# Patient Record
Sex: FEMALE | Race: BLACK OR AFRICAN AMERICAN | URBAN - METROPOLITAN AREA
[De-identification: names, ages, dates, MRNs, and addresses within clinical notes are randomized per-mention and may not be internally consistent; named-entity substitution may affect disease eponyms.]

---

## 2023-10-30 DIAGNOSIS — J33.9 NOSE POLYP: Primary | ICD-10-CM

## 2023-10-30 RX ORDER — FLUTICASONE PROPIONATE 93 UG/1
1 SPRAY, METERED NASAL 2 TIMES DAILY
COMMUNITY
Start: 2019-04-29 | End: 2023-10-30 | Stop reason: SDUPTHER

## 2023-10-31 RX ORDER — FLUTICASONE PROPIONATE 93 UG/1
1 SPRAY, METERED NASAL 2 TIMES DAILY
Qty: 16 ML | Refills: 11 | Status: SHIPPED | OUTPATIENT
Start: 2023-10-31

## 2023-11-27 ENCOUNTER — OFFICE VISIT (OUTPATIENT)
Dept: OTOLARYNGOLOGY | Facility: CLINIC | Age: 53
End: 2023-11-27
Payer: COMMERCIAL

## 2023-11-27 VITALS — HEIGHT: 63 IN | BODY MASS INDEX: 29.16 KG/M2 | WEIGHT: 164.6 LBS | TEMPERATURE: 96.6 F

## 2023-11-27 DIAGNOSIS — J32.2 CHRONIC ETHMOIDAL SINUSITIS: ICD-10-CM

## 2023-11-27 DIAGNOSIS — R09.81 NASAL CONGESTION: ICD-10-CM

## 2023-11-27 DIAGNOSIS — J33.9 NASAL POLYP: ICD-10-CM

## 2023-11-27 DIAGNOSIS — R43.8 DECREASED SENSE OF SMELL: Primary | ICD-10-CM

## 2023-11-27 PROCEDURE — 31231 NASAL ENDOSCOPY DX: CPT | Performed by: OTOLARYNGOLOGY

## 2023-11-27 PROCEDURE — 99213 OFFICE O/P EST LOW 20 MIN: CPT | Performed by: OTOLARYNGOLOGY

## 2023-11-27 RX ORDER — MONTELUKAST SODIUM 10 MG/1
1 TABLET ORAL NIGHTLY
COMMUNITY
Start: 2015-03-17

## 2023-11-27 RX ORDER — OMEPRAZOLE 40 MG/1
1 CAPSULE, DELAYED RELEASE ORAL DAILY
COMMUNITY
Start: 2021-11-16

## 2023-11-27 RX ORDER — ASPIRIN 325 MG
325 TABLET ORAL
COMMUNITY

## 2023-11-27 RX ORDER — FLUTICASONE FUROATE AND VILANTEROL TRIFENATATE 200; 25 UG/1; UG/1
POWDER RESPIRATORY (INHALATION)
COMMUNITY

## 2023-11-27 RX ORDER — ALBUTEROL SULFATE 90 UG/1
AEROSOL, METERED RESPIRATORY (INHALATION)
COMMUNITY
Start: 2022-05-23

## 2023-11-27 RX ORDER — HYDROCHLOROTHIAZIDE 25 MG/1
25 TABLET ORAL
COMMUNITY
Start: 2023-05-31

## 2023-11-27 RX ORDER — BUDESONIDE 0.5 MG/2ML
INHALANT ORAL
COMMUNITY

## 2023-11-27 RX ORDER — SALINE NASAL SPRAY 1.5 OZ
SOLUTION NASAL
COMMUNITY
Start: 2018-08-17

## 2023-11-27 RX ORDER — LORATADINE 10 MG/1
CAPSULE, LIQUID FILLED ORAL
COMMUNITY
Start: 2018-12-21

## 2023-11-27 RX ORDER — ALBUTEROL SULFATE 0.83 MG/ML
2.5 SOLUTION RESPIRATORY (INHALATION) EVERY 6 HOURS PRN
COMMUNITY
Start: 2020-06-16

## 2023-11-27 ASSESSMENT — PATIENT HEALTH QUESTIONNAIRE - PHQ9
2. FEELING DOWN, DEPRESSED OR HOPELESS: SEVERAL DAYS
SUM OF ALL RESPONSES TO PHQ9 QUESTIONS 1 & 2: 2
1. LITTLE INTEREST OR PLEASURE IN DOING THINGS: SEVERAL DAYS

## 2023-11-27 NOTE — PROGRESS NOTES
Chief Complaint:  1. Chronic sinusitis with nasal polyposis s/p bilateral sinus surgery 8/17/18; revision through [facility] 2020  2. Nasal airway obstruction s/p nasal airway surgery 8/17/18   3. Rhinorrhea  4. Headaches  5. Decreased sense of smell (COVID positive Oct 2020 and Dec 2021)  6. Throat clearing, coughing  7. Chronic rhinitis, epistaxis  8. Asthma / aspirin sensitivity (s/p ASA desensitization)  9. Allergic rhinitis    History Of Present Illness:    Main Symptoms:  Patient does not have anterior nasal drainage.     Patient has  posterior nasal drainage.    Patient has left-sided nasal airway obstruction and took Sudafed today for this.  Patient does not have  facial pain.    Patient does not have  facial pressure.    Patient has decreased sense of smell. Decreased 5 % of normal.   Associated Symptoms:   Patient has  headaches, and she has a headache today on the left side.   Patient has throat clearing that is constant in the morning.   Patient has coughing.    Patient does not have dysphonia.   Patient has minor nasal bleeding.     Medications currently on for sinonasal symptoms: She tries to utilize Xhance 2 sprays each nostril once per day and budesonide rinses once per day and does this about 5 days/week.  On the days that she does not use both she uses 1 or a other once per day.  Aspirin 325 mg daily, Sudafed as needed    Kate Navarro presents being last seen 6/6/23.     She continues with symptoms as outlined above.  She has not yet been assessed for allergy to determine if she is a candidate for a biologic.     Active Problems:    There is no problem list on file for this patient.       Past Medical History:  She has a past medical history of Allergy status to unspecified drugs, medicaments and biological substances, Chronic maxillary sinusitis (11/13/2018), Encounter for fertility testing (06/26/2015), Encounter for screening for infections with a predominantly sexual mode of transmission (03/15/2018),  "Hypertrophy of nasal turbinates (05/21/2018), Other acute sinusitis (02/11/2017), Other conditions influencing health status (06/26/2015), Other conditions influencing health status (02/11/2017), Other ovarian dysfunction (09/04/2015), Personal history of other benign neoplasm (06/26/2015), Personal history of other diseases of the respiratory system, Personal history of other diseases of the respiratory system (08/28/2018), Personal history of other diseases of the respiratory system (07/10/2018), Personal history of other diseases of the respiratory system (07/10/2018), Personal history of other specified conditions (07/10/2018), Unspecified asthma, uncomplicated (07/10/2018), and Unspecified hearing loss, left ear (11/13/2018).    Surgical History:  She has no past surgical history on file.     Family History:  No family history on file.    Social History:  She reports that she has been smoking cigarettes. She does not have any smokeless tobacco history on file. No history on file for alcohol use and drug use.     Allergies:  Patient has no allergy information on record.    Current Meds:    Current Outpatient Medications:     Xhance 93 mcg/actuation aerosol breath activated, Administer 1 spray into affected nostril(s) 2 times a day., Disp: 16 mL, Rfl: 11    Vitals:  Visit Vitals  Temp 35.9 °C (96.6 °F)   Ht 1.6 m (5' 3\")   Wt 74.7 kg (164 lb 9.6 oz)   BMI 29.16 kg/m²   Smoking Status Every Day   BSA 1.82 m²        Physical Exam:  Nose: On external exam there are neither lesions nor asymmetry of the nasal tip/dorsum. On anterior rhinoscopy, visualization posteriorly is limited on anterior examination. For this reason, to adequately evaluate posteriorly for masses, source of epistaxis, polypoid disease, debridement, and/or signs of infections, nasal endoscopy is indicated.  (Please see procedure below.)    SINONASAL ENDOSCOPY (CPT 23044): To better evaluate the patient's symptoms, sinonasal endoscopy is indicated.  " After discussion of risks and benefits, and topical decongestion and anesthesia,an endoscope was used to perform nasal endoscopy on each side.  A time out identifying the patient, the procedure, the location of the procedure and any concerns was performed prior to beginning the procedure.    Findings:  Examination of the right nasal cavity revealed a patent maxillary sinus, ethmoid cavity, and sphenoid.  There was polypoid tissue along the ethmoid skull base but the maxillary and sphenoid mucosa looked normal.  Examination of the left nasal cavity revealed more significant polypoid tissue.  The base of the maxillary and the sphenoid were patent but there as polypoid tissue throughout the ethmoid cavity extending to the superior aspect of the inferior turbinate.  This was associated with mucus.    Provider Impressions:  1.  Chronic sinusitis with nasal polyposis s/p bilateral sinus surgery 8/17/18; revision through [facility] 2020  2.  Nasal airway obstruction s/p nasal airway surgery 8/17/18   3.  Postnasal drainage  4.  Decreased sense of smell  5.  Headaches  6.  Throat clearing, coughing  7.  Intermittent epistaxis  8.  Asthma / aspirin sensitivity (s/p ASA desensitization)  9.  Allergic rhinitis    Discussion:  Kate Navarro and I discussed a number of options.  She has had 2 previous sinus surgeries and I would not recommend revision surgery at this time.  Her current intranasal medical therapy is very good but she could consider mometasone 2 mg instead of budesonide we discussed some considerations with this therapy particularly financial.    We also again reviewed consideration of a biologic through one of my allergy partners.  She did not wish to give herself injections but there are Biologics that are administered through a physician's office and this may be a better option for her than something that she needs to utilize at home and administer to herself.  I provided her with a consultation to be evaluated by   Nani to discuss further options.  I also stressed to her limiting the amount of prednisone taper she gets as there are long-term health consequences to frequent administration.    I would like to see her back about 6 months after she initiates biologic therapy for reassessment.  If she wishes to defer biologic therapy in favor of alternative intranasal medical therapy I think she would do well with a trial of mometasone 2 mg within rinses.  All questions were answered.    Patient Discussion/Summary:  Please followup with me in 6 months for reevaluation or sooner with any questions or concerns. Please feel free to contact my office by calling [phone] with any questions.    Signature:  Scribe Attestation  By signing my name below, IGladis Scribe   attest that this documentation has been prepared under the direction and in the presence of Francois Ceballos MD

## 2023-12-20 ENCOUNTER — OFFICE VISIT (OUTPATIENT)
Dept: ALLERGY | Facility: HOSPITAL | Age: 53
End: 2023-12-20
Payer: COMMERCIAL

## 2023-12-20 VITALS
BODY MASS INDEX: 28.7 KG/M2 | RESPIRATION RATE: 16 BRPM | TEMPERATURE: 98 F | HEART RATE: 68 BPM | OXYGEN SATURATION: 100 % | WEIGHT: 162 LBS | SYSTOLIC BLOOD PRESSURE: 121 MMHG | DIASTOLIC BLOOD PRESSURE: 77 MMHG | HEIGHT: 63 IN

## 2023-12-20 DIAGNOSIS — J31.0 CHRONIC RHINITIS: ICD-10-CM

## 2023-12-20 DIAGNOSIS — J33.9 NASAL POLYP: ICD-10-CM

## 2023-12-20 DIAGNOSIS — J45.40 MODERATE PERSISTENT ASTHMA WITHOUT COMPLICATION (HHS-HCC): Primary | ICD-10-CM

## 2023-12-20 PROCEDURE — 99205 OFFICE O/P NEW HI 60 MIN: CPT | Performed by: ALLERGY & IMMUNOLOGY

## 2023-12-20 PROCEDURE — 99215 OFFICE O/P EST HI 40 MIN: CPT | Performed by: ALLERGY & IMMUNOLOGY

## 2023-12-20 ASSESSMENT — ENCOUNTER SYMPTOMS
MUSCULOSKELETAL NEGATIVE: 1
RESPIRATORY NEGATIVE: 1
CONSTITUTIONAL NEGATIVE: 1
HEMATOLOGIC/LYMPHATIC NEGATIVE: 1
GASTROINTESTINAL NEGATIVE: 1
CARDIOVASCULAR NEGATIVE: 1
ALLERGIC/IMMUNOLOGIC NEGATIVE: 1
EYES NEGATIVE: 1

## 2023-12-20 NOTE — PROGRESS NOTES
Kate Navarro presents for initial evaluation today.      Kate Navarro was seen at the request of Jean Carlos Collazo MD for a chief complaint of nasal polyps, asthma, allergies; a report with my findings is being sent via written or electronic means to Jean Carlos Collazo MD with my recommendations for treatment    She provides the following history:    She states that she gets a sinus infection every 90 days.  She also has a history of nasal polyposis and has had polypectomy x2.  She notes that approximately every 90 days she gets postnasal drainage, green mucus, loss of sense of smell and taste.  She generally takes antibiotics and prednisone every time.  She notes that the last time she felt like she had a sinus infection, she had rhinoscopy performed in ENT and there was no infection noted.     She is on nasal rinses with budesonide daily, Xhance 2 sprays once daily.  She also takes Claritin, montelukast, Breo, and aspirin 325 mg once daily after having an aspirin desensitization for AERD in October 2018 at [facility], with Dr. Mchugh.  She had also had aspirin desensitization years prior to her 10/2018 aspirin desensitization and had stopped taking aspirin due to GI upset and between the 2 aspirin desensitizations.    She notes that prior to aspirin desensitization, she would have an asthma exacerbation nasal congestion with ingestion of aspirin or NSAIDs.      She has also previously followed with Dr. Abbott for allergies and had allergy in July 2018 she notes that her allergy symptoms include itchy eyes, runny nose nasal congestion, occurring year-round.  She has never been on allergy shots.    Asthma: She reports that she was diagnosed with asthma in her late 30s.  She is on Breo and Singulair.  She last required rescue albuterol approximately 1 year ago.      Eczema: States that she has a history of eczema.  Flares in summer on neck, worsened with necklaces     Food allergy: Denies     Venom allergy:  Denies     Drug allergy:  "ASA/NSAIDS      Environmental History:  Type of home:  House  Pets in the house: None  Mold or moisture in the home: None  Bedroom osmar: Carpet  Dust mite covers on bed:  Yes  Cigarette exposure in the home:  Yes 2-3 cigs daily  Occupation/School: works from home for an [employer], works from home, then works at [employer] in evenings     Pertinent Allergy/Immunology family history:  Environmental allergies and shellfish allergy in brother and sister   Sister has chronic bronchitis     Review of Systems   Constitutional: Negative.    HENT: Negative.     Eyes: Negative.    Respiratory: Negative.     Cardiovascular: Negative.    Gastrointestinal: Negative.    Musculoskeletal: Negative.    Skin: Negative.    Allergic/Immunologic: Negative.    Hematological: Negative.         Vital signs:  /77 (BP Location: Right arm)   Pulse 68   Temp 36.7 °C (98 °F) (Oral)   Resp 16   Ht 1.6 m (5' 3\")   Wt 73.5 kg (162 lb)   SpO2 100%   BMI 28.70 kg/m²     Physical Exam:  GENERAL: Alert, oriented and in no acute distress.     HEENT: EYES: No conjunctival injection or cobblestoning. Nose: nasal turbinates mildly edematous and are not boggy.  There is no mucous stranding, polyps, or blood    noted. EARS: Tympanic membranes are clear. MOUTH: moist and pink with no exudates, ulcers, or thrush. NECK: is supple, without adenopathy.  No upper airway stridor noted.       HEART: regular rate and rhythm.       LUNGS: Clear to auscultation bilaterally. No wheezing, rhonchi or rales.        ABDOMEN: Positive bowel sounds, soft, nontender, nondistended.       EXTREMITIES: No clubbing or edema.        NEURO:  Normal affect.  Gait normal.      SKIN: No rash, hives, or angioedema noted      Impression:  1. Moderate persistent asthma without complication    2. Nasal polyp    3. Chronic rhinitis      Assessment and Plan:  Asthma, moderate persistent; nasal polyposis; AERD/Samter's triad: History of aspirin sensitive asthma, s/p aspirin " desensitization.  She has been maintained on 325 mg daily and has recurrent polypoid growth.  We discussed options for management of AERD which include increasing aspirin dose as 325 mg daily may not be sufficient dose to prevent polyp regrowth, or consideration of Dupixent.  Discussed risks and benefits of both approaches.  She has previously had 2 aspirin desensitizations and continues to have nasal polyp regrowth.  It is also noted that she stopped aspirin use in between the 2 desensitizations due to gastric upset, so may have difficulty tolerating the higher dose of aspirin necessary to prevent polyp regrowth.  At this time, she would like to consider Dupixent therapy.  We also discussed that we recommend assessment of lung function, and PFTs were ordered.  Continue Breo and as needed albuterol.  Discussed black box warning on montelukast for mood changes.  If she does not find that montelukast is providing benefit, could consider discontinuation.  Will discuss management further pending results of PFTs.    Chronic rhinitis: History of allergic rhinitis, last allergy tested in 2018.  Plan for environmental allergy skin testing next visit when she is off antihistamines.

## 2023-12-20 NOTE — PATIENT INSTRUCTIONS
It was nice to meet you today!     Please have pulmonary function testing completed.  If they do not call to schedule you, you can call  Dl PFT's [phone]    Please stop antihistamines for 7 days prior to allergy testing     Please visit [email or web address] for more information on Dupixent     For allergy testing, you can check the following code with your insurance company to determine if you'll have a copay prior to the end of the year- [id number], quantity 44

## 2023-12-22 ENCOUNTER — SPECIALTY PHARMACY (OUTPATIENT)
Dept: PHARMACY | Facility: CLINIC | Age: 53
End: 2023-12-22

## 2024-01-11 DIAGNOSIS — J33.9 NASAL POLYP: ICD-10-CM

## 2024-01-17 ENCOUNTER — HOSPITAL ENCOUNTER (OUTPATIENT)
Dept: RESPIRATORY THERAPY | Facility: HOSPITAL | Age: 54
Discharge: HOME | End: 2024-01-17
Payer: COMMERCIAL

## 2024-01-17 DIAGNOSIS — J45.40 MODERATE PERSISTENT ASTHMA WITHOUT COMPLICATION (HHS-HCC): ICD-10-CM

## 2024-01-17 DIAGNOSIS — J45.30 MILD PERSISTENT ALLERGIC ASTHMA (HHS-HCC): ICD-10-CM

## 2024-01-17 PROCEDURE — 94060 EVALUATION OF WHEEZING: CPT | Performed by: INTERNAL MEDICINE

## 2024-01-17 PROCEDURE — 94726 PLETHYSMOGRAPHY LUNG VOLUMES: CPT | Performed by: INTERNAL MEDICINE

## 2024-01-17 PROCEDURE — 94729 DIFFUSING CAPACITY: CPT | Performed by: INTERNAL MEDICINE

## 2024-01-19 ENCOUNTER — TELEPHONE (OUTPATIENT)
Dept: ALLERGY | Facility: CLINIC | Age: 54
End: 2024-01-19
Payer: COMMERCIAL

## 2024-01-19 DIAGNOSIS — J33.9 NASAL POLYP: ICD-10-CM

## 2024-01-19 NOTE — TELEPHONE ENCOUNTER
Result Communication    No results found from the In Basket message.    10:41 AM      Results were successfully communicated with the patient and they acknowledged their understanding.

## 2024-01-19 NOTE — TELEPHONE ENCOUNTER
Please let patient know that PFTs show mild obstruction that improved with bronchodilator (Albuterol) which is consistent with asthma that is not fully controlled.  We can discuss options such as starting Dupixent or stepping up her inhaler therapy.  Please let us know her preference.

## 2024-01-24 ENCOUNTER — TELEPHONE (OUTPATIENT)
Dept: ALLERGY | Facility: CLINIC | Age: 54
End: 2024-01-24

## 2024-02-01 ENCOUNTER — APPOINTMENT (OUTPATIENT)
Dept: ALLERGY | Facility: CLINIC | Age: 54
End: 2024-02-01
Payer: COMMERCIAL

## 2024-02-09 ENCOUNTER — TELEPHONE (OUTPATIENT)
Dept: OTOLARYNGOLOGY | Facility: CLINIC | Age: 54
End: 2024-02-09
Payer: COMMERCIAL

## 2024-02-09 DIAGNOSIS — J32.2 CHRONIC ETHMOIDAL SINUSITIS: ICD-10-CM

## 2024-02-09 RX ORDER — DOXYCYCLINE 100 MG/1
100 TABLET ORAL 2 TIMES DAILY
Qty: 20 TABLET | Refills: 0 | Status: SHIPPED | OUTPATIENT
Start: 2024-02-09 | End: 2024-02-19

## 2024-02-09 NOTE — TELEPHONE ENCOUNTER
Patient called into office today to inform Dr. Ceballos that she is going to be getting allergy testing and her first round of Dupixent next week however, she has concerns of a sinus infection at this time. Patient is worried this will delay her Dupixent administration. Patient reports she recently had an injection scheduled but was positive for Shingles on January 23, 2024, and was treated with 7 days of Valtrex. Because of this she had to postpone the Dupixent due to the outbreak. Today, patient reports sinus symptoms are nasal congestion, PND, discolored nasal drainage for the last 2 weeks. Patient states she was normally using Budesonide in rinses BID but has stopped adding the Budesonide to this recently due her upcoming allergy test. Patient  reports she continues to use saline irrigations but it is becoming more difficult to get the saline inside of her nose due to the congestion.     Discussed with KAYCEE Mcghee who gave verbal order for Doxycycline 100mg BID for 10 days. Medication education provided to patient, advised patient to take antibiotic after meals, avoid dairy products 2 hours before and after administration, and encouraged a daily probiotic while on antibiotic. Patient educated on antibiotic causing sunlight sensitivity to skin and encouraged SPF use. Advised patient to discontinue medications if adverse effects. Patient advised to let her allergist know she will be taking this medication in regards to her scheduled Dupixent administration. Patient also encouraged to resume Budesonide irrigations once she is able to. Patient agrees to plan of care and prescription sent to pharmacy.

## 2024-02-15 ENCOUNTER — LAB (OUTPATIENT)
Dept: LAB | Facility: LAB | Age: 54
End: 2024-02-15
Payer: COMMERCIAL

## 2024-02-15 ENCOUNTER — OFFICE VISIT (OUTPATIENT)
Dept: ALLERGY | Facility: CLINIC | Age: 54
End: 2024-02-15
Payer: COMMERCIAL

## 2024-02-15 VITALS
BODY MASS INDEX: 28.7 KG/M2 | SYSTOLIC BLOOD PRESSURE: 130 MMHG | WEIGHT: 162 LBS | DIASTOLIC BLOOD PRESSURE: 86 MMHG | TEMPERATURE: 97.8 F | HEIGHT: 63 IN | OXYGEN SATURATION: 98 % | HEART RATE: 75 BPM

## 2024-02-15 DIAGNOSIS — J31.0 CHRONIC RHINITIS: ICD-10-CM

## 2024-02-15 DIAGNOSIS — J31.0 CHRONIC RHINITIS: Primary | ICD-10-CM

## 2024-02-15 DIAGNOSIS — J33.9 NASAL POLYP: ICD-10-CM

## 2024-02-15 DIAGNOSIS — J45.40 MODERATE PERSISTENT ASTHMA WITHOUT COMPLICATION (HHS-HCC): ICD-10-CM

## 2024-02-15 PROCEDURE — 36415 COLL VENOUS BLD VENIPUNCTURE: CPT

## 2024-02-15 PROCEDURE — 86003 ALLG SPEC IGE CRUDE XTRC EA: CPT

## 2024-02-15 PROCEDURE — 82785 ASSAY OF IGE: CPT

## 2024-02-15 ASSESSMENT — ENCOUNTER SYMPTOMS
EYES NEGATIVE: 1
ALLERGIC/IMMUNOLOGIC NEGATIVE: 1
HEMATOLOGIC/LYMPHATIC NEGATIVE: 1
GASTROINTESTINAL NEGATIVE: 1
CONSTITUTIONAL NEGATIVE: 1
RESPIRATORY NEGATIVE: 1
MUSCULOSKELETAL NEGATIVE: 1
CARDIOVASCULAR NEGATIVE: 1

## 2024-02-15 ASSESSMENT — PAIN SCALES - GENERAL: PAINLEVEL: 0-NO PAIN

## 2024-02-15 NOTE — PATIENT INSTRUCTIONS
Please have labs drawn. Please note that some labs will come back sooner than others.  We will contact you when all labs have returned so that we can discuss results.     We would like to see you in follow up in 6 months or sooner if needed

## 2024-02-16 ENCOUNTER — TELEPHONE (OUTPATIENT)
Dept: ALLERGY | Facility: CLINIC | Age: 54
End: 2024-02-16
Payer: COMMERCIAL

## 2024-02-16 DIAGNOSIS — J30.81 ALLERGIC RHINITIS DUE TO ANIMAL HAIR OR DANDER: Primary | ICD-10-CM

## 2024-02-16 LAB
A ALTERNATA IGE QN: <0.1 KU/L
A FUMIGATUS IGE QN: <0.1 KU/L
BERMUDA GRASS IGE QN: 0.21 KU/L
BOXELDER IGE QN: 0.9 KU/L
C HERBARUM IGE QN: <0.1 KU/L
CALIF WALNUT POLN IGE QN: 0.38 KU/L
CAT DANDER IGE QN: 0.54 KU/L
CMN PIGWEED IGE QN: 0.21 KU/L
COMMON RAGWEED IGE QN: 0.23 KU/L
COTTONWOOD IGE QN: 0.19 KU/L
D FARINAE IGE QN: <0.1 KU/L
D PTERONYSS IGE QN: <0.1 KU/L
DOG DANDER IGE QN: 0.11 KU/L
ENGL PLANTAIN IGE QN: 0.28 KU/L
GOOSEFOOT IGE QN: 0.14 KU/L
JOHNSON GRASS IGE QN: <0.1 KU/L
KENT BLUE GRASS IGE QN: 0.43 KU/L
LONDON PLANE IGE QN: 0.13 KU/L
MT JUNIPER IGE QN: <0.1 KU/L
P NOTATUM IGE QN: <0.1 KU/L
PECAN/HICK TREE IGE QN: 0.22 KU/L
ROACH IGE QN: <0.1 KU/L
SALTWORT IGE QN: <0.1 KU/L
SHEEP SORREL IGE QN: 0.2 KU/L
SILVER BIRCH IGE QN: 0.18 KU/L
TIMOTHY IGE QN: 0.32 KU/L
TOTAL IGE SMQN RAST: 231 KU/L
WHITE ASH IGE QN: 0.3 KU/L
WHITE ELM IGE QN: 0.17 KU/L
WHITE MULBERRY IGE QN: <0.1 KU/L
WHITE OAK IGE QN: 0.13 KU/L

## 2024-02-16 RX ORDER — CETIRIZINE HYDROCHLORIDE 10 MG/1
10 TABLET ORAL DAILY PRN
Qty: 30 TABLET | Refills: 11 | Status: SHIPPED | OUTPATIENT
Start: 2024-02-16 | End: 2024-02-28 | Stop reason: SDUPTHER

## 2024-02-16 NOTE — TELEPHONE ENCOUNTER
Patient wants to know with starting dupixent should she continue to take 325 mg of aspirin daily.   As well as the montelukast and xhance

## 2024-02-16 NOTE — TELEPHONE ENCOUNTER
Please let Ms. Navarro know that her environmental allergy testing was positive to cat with many very low positives to pollen.  Recommend continuing her current therapy, however we will switch loratadine to cetirizine.  Dupixent will likely be helpful for her environmental allergy symptoms as well her nasal polyps.

## 2024-02-16 NOTE — TELEPHONE ENCOUNTER
Spoke with patient to continue medication regimen and follow up with ENT after starting the dupixent to see if the medications can come off

## 2024-02-16 NOTE — TELEPHONE ENCOUNTER
Result Communication    Resulted Orders   Respiratory Allergy Profile IgE   Result Value Ref Range    Immunocap IgE 231 (H) <=214 KU/L      Comment:      Note: Omalizumab (Xolair, GenePPTV; humanized  IgG1 antihuman IgE Fc) treatment does not  significantly interfere with the accuracy of  total IgE on the ImmunoCAP (Netccm) platform.  J Allergy Clin Immunol 2006;117:759-66).  Allergens, parasitic diseases, smoking, and  alcohol consumption have been reported to  increase levels of total IgE in serum.    Bermuda Grass IgE 0.21 (Equiv IgE) <0.10 kU/L    Minesh Grass IgE <0.10 <0.10 kU/L    Draper Grass, [place] Blue IgE 0.43 (Low) <0.10 kU/L    Donte Grass IgE 0.32 (Equiv IgE) <0.10 kU/L    Goosefoot, Lamb's Quarters IgE 0.14 (Equiv IgE) <0.10 kU/L    Common Pigweed IgE 0.21 (Equiv IgE) <0.10 kU/L    Common Ragweed IgE 0.23 (Equiv IgE) <0.10 kU/L    White Timoteo IgE 0.30 (Equiv IgE) <0.10 kU/L    Common Silver Birch IgE 0.18 (Equiv IgE) <0.10 kU/L    Box-Elder IgE 0.90 (Mod) <0.10 kU/L    Mountain Juniper IgE <0.10 <0.10 kU/L    [place] IgE 0.19 (Equiv IgE) <0.10 kU/L    Elm IgE 0.17 (Equiv IgE) <0.10 kU/L    [place] IgE <0.10 <0.10 kU/L    Pecan, Hickory IgE 0.22 (Equiv IgE) <0.10 kU/L    Maple [place], [place] IgE 0.13 (Equiv IgE) <0.10 kU/L    Quail Tree IgE 0.38 (Low) <0.10 kU/L    Russian Thistle IgE <0.10 <0.10 kU/L    Sheep Sorrel IgE 0.20 (Equiv IgE) <0.10 kU/L    Cat Dander IgE 0.54 (Low) <0.10 kU/L    Dog Dander IgE 0.11 (Equiv IgE) <0.10 kU/L    Alternaria Alternata IgE <0.10 <0.10 kU/L    Cladosporium Herbarum IgE <0.10 <0.10 kU/L    English Plantain IgE 0.28 (Equiv IgE) <0.10 kU/L    Dust Mite (D. farinae) IgE <0.10 <0.10 kU/L    Dust Mite (D. pteronyssinus) IgE <0.10 <0.10 kU/L    Gibraltarian Cockroach IgE <0.10 <0.10 kU/L    Aspergillus Fumigatus IgE <0.10 <0.10 kU/L    Oak IgE 0.13 (Equiv IgE) <0.10 kU/L    Penicillium Chrysogenum IgE <0.10 <0.10 kU/L    Narrative    Interpretation  Scale  <0.10 kU/L - Class 0 Allergen: ABSENT OR UNDETECTABLE ALLERGEN SPECIFIC IgE  0.10-0.34 kU/L - Class 0/1 Allergen: EQUIVOCAL LEVEL OF ALLERGEN SPECIFIC IgE  0.35-0.69 kU/L - Class 1 Allergen: LOW LEVEL OF ALLERGEN SPECIFIC IgE  0.70-3.49 kU/L - Class 2 Allergen: MODERATE LEVEL OF ALLERGEN SPECIFIC IgE  3.50-17.49 kU/L - Class 3 Allergen: HIGH LEVEL OF ALLERGEN SPECIFIC IgE  17.50-49.99 kU/L - Class 4 Allergen: VERY HIGH LEVEL OF ALLERGEN SPECIFIC IgE  50..00 kU/L - Class 5 Allergen: ULTRA HIGH LEVEL OF ALLERGEN SPECIFIC IgE  >100.00 kU/L - Class 6 Allergen: EXTREMELY HIGH LEVEL OF ALLERGEN SPECIFIC IgE       2:53 PM      Results were successfully communicated with the patient and they acknowledged their understanding.

## 2024-02-28 ENCOUNTER — TELEPHONE (OUTPATIENT)
Dept: ALLERGY | Facility: CLINIC | Age: 54
End: 2024-02-28
Payer: COMMERCIAL

## 2024-02-28 ENCOUNTER — CLINICAL SUPPORT (OUTPATIENT)
Dept: ALLERGY | Facility: HOSPITAL | Age: 54
End: 2024-02-28
Payer: COMMERCIAL

## 2024-02-28 VITALS — TEMPERATURE: 97.9 F | DIASTOLIC BLOOD PRESSURE: 90 MMHG | HEART RATE: 79 BPM | SYSTOLIC BLOOD PRESSURE: 132 MMHG

## 2024-02-28 DIAGNOSIS — J30.81 ALLERGIC RHINITIS DUE TO ANIMAL HAIR OR DANDER: ICD-10-CM

## 2024-02-28 DIAGNOSIS — J33.9 NASAL POLYPS: ICD-10-CM

## 2024-02-28 PROCEDURE — 2500000001 HC RX 250 WO HCPCS SELF ADMINISTERED DRUGS (ALT 637 FOR MEDICARE OP): Performed by: ALLERGY & IMMUNOLOGY

## 2024-02-28 RX ORDER — CETIRIZINE HYDROCHLORIDE 10 MG/1
10 TABLET ORAL DAILY PRN
Qty: 30 TABLET | Refills: 11 | Status: SHIPPED | OUTPATIENT
Start: 2024-02-28 | End: 2025-02-27

## 2024-02-28 RX ADMIN — DUPILUMAB 300 MG: 300 INJECTION, SOLUTION SUBCUTANEOUS at 10:45

## 2024-02-28 ASSESSMENT — ENCOUNTER SYMPTOMS
LOSS OF SENSATION IN FEET: 0
OCCASIONAL FEELINGS OF UNSTEADINESS: 0
DEPRESSION: 0

## 2024-02-28 NOTE — PROGRESS NOTES
Kate here today for her regularly scheduled biologic injection, per protocol. Patient in good state of health, received her shot as planned, as recorded in flowsheet for this encounter, waited for 30 minutes after her injection and left the office after that still at their baseline state of health. Will continue Dupixent  as planned and call us in case any symptoms or reaction from today's injection are noted.

## 2024-02-28 NOTE — TELEPHONE ENCOUNTER
Reached out to [facility]. Patient stated she has a $40.00 copay for dupixent. [facility] stated they are receiving a denial of the dupixent my way copay card. Provided patient for dupixent my way to confirm the copay card is active. Encouraged patient to contact us if she is still having difficulties.

## 2024-03-14 DIAGNOSIS — J33.9 NASAL POLYP: ICD-10-CM

## 2024-05-17 ENCOUNTER — TELEPHONE (OUTPATIENT)
Dept: ALLERGY | Facility: CLINIC | Age: 54
End: 2024-05-17
Payer: COMMERCIAL

## 2024-05-17 NOTE — TELEPHONE ENCOUNTER
Patient called because she feels like she has a sinus infection and needs an antibiotic. I returned call to let her know it was best to reach out to the PCP to address the sinus infection. Patient was concerned if there was any contraindications to taking a medication on dupixent. Will follow up with Dr. Cardoza for concerns.

## 2024-06-26 ENCOUNTER — TELEPHONE (OUTPATIENT)
Dept: ALLERGY | Facility: CLINIC | Age: 54
End: 2024-06-26

## 2024-06-27 ENCOUNTER — APPOINTMENT (OUTPATIENT)
Dept: ALLERGY | Facility: CLINIC | Age: 54
End: 2024-06-27
Payer: COMMERCIAL

## 2024-06-27 DIAGNOSIS — J45.40 MODERATE PERSISTENT ASTHMA WITHOUT COMPLICATION (HHS-HCC): ICD-10-CM

## 2024-06-27 DIAGNOSIS — J33.9 NASAL POLYP: Primary | ICD-10-CM

## 2024-06-27 DIAGNOSIS — J30.81 ALLERGIC RHINITIS DUE TO ANIMAL HAIR OR DANDER: ICD-10-CM

## 2024-06-27 PROCEDURE — 99214 OFFICE O/P EST MOD 30 MIN: CPT | Performed by: ALLERGY & IMMUNOLOGY

## 2024-06-27 ASSESSMENT — ENCOUNTER SYMPTOMS
HEMATOLOGIC/LYMPHATIC NEGATIVE: 1
CONSTITUTIONAL NEGATIVE: 1
ALLERGIC/IMMUNOLOGIC NEGATIVE: 1
MUSCULOSKELETAL NEGATIVE: 1
RESPIRATORY NEGATIVE: 1
EYES NEGATIVE: 1
CARDIOVASCULAR NEGATIVE: 1
GASTROINTESTINAL NEGATIVE: 1

## 2024-06-27 NOTE — PROGRESS NOTES
Virtual or Telephone Consent    A telephone visit (audio only) between the patient (at the originating site) and the provider (at the distant site) was utilized to provide this telehealth service.   Verbal consent was requested and obtained from Kate Navarro on this date, 06/27/24 for a telehealth visit.   Video connection did not work for this visit.     Kate Navarro presents for follow-up visit today.  She states that since last visit, she has been doing well.  She started Dupixent.  She notes that things were going well until May 20 when she developed a sinus infection.  She had an exacerbation of her asthma at that time related to her sinus infection.  She was treated with antibiotics, and symptoms resolved.  Since that time she has not required any rescue use of inhalers.  She denies nasal congestion or postnasal drainage.  She continues on Xhance, Claritin, montelukast, aspirin 325 mg.  She is hopeful to get off aspirin in the future if Dupixent is working.  She was last seen in ENT in November 2023.    Initial history, 12/20/23:   She states that she gets a sinus infection every 90 days.  She also has a history of nasal polyposis and has had polypectomy x2.  She notes that approximately every 90 days she gets postnasal drainage, green mucus, loss of sense of smell and taste.  She generally takes antibiotics and prednisone every time.  She notes that the last time she felt like she had a sinus infection, she had rhinoscopy performed in ENT and there was no infection noted.     She is on nasal rinses with budesonide daily, Xhance 2 sprays once daily.  She also takes Claritin, montelukast, Breo, and aspirin 325 mg once daily after having an aspirin desensitization for AERD in October 2018 at [facility], with Dr. Mchugh.  She had also had aspirin desensitization years prior to her 10/2018 aspirin desensitization and had stopped taking aspirin due to GI upset and between the 2 aspirin desensitizations.    She notes that  prior to aspirin desensitization, she would have an asthma exacerbation nasal congestion with ingestion of aspirin or NSAIDs.      She has also previously followed with Dr. Abbott for allergies and had allergy in July 2018 she notes that her allergy symptoms include itchy eyes, runny nose nasal congestion, occurring year-round.  She has never been on allergy shots.    Asthma: She reports that she was diagnosed with asthma in her late 30s.  She is on Breo and Singulair.  She last required rescue albuterol approximately 1 year ago.      Eczema: States that she has a history of eczema.  Flares in summer on neck, worsened with necklaces     Environmental History:  Type of home:  House  Pets in the house: None  Mold or moisture in the home: None  Bedroom osmar: Carpet  Dust mite covers on bed:  Yes  Cigarette exposure in the home:  Yes 2-3 cigs daily  Occupation/School: works from home for an [employer], works from home, then works at [employer] in evenings     Review of Systems   Constitutional: Negative.    HENT: Negative.     Eyes: Negative.    Respiratory: Negative.     Cardiovascular: Negative.    Gastrointestinal: Negative.    Musculoskeletal: Negative.    Skin: Negative.    Allergic/Immunologic: Negative.    Hematological: Negative.       Vital signs:  Not obtained due to virtual visit    Physical Exam:  Alert, oriented and in no acute distress. No vocal wheeze or cough noted     Environmental IgE testing    Lab Results   Component Value Date    ICIGE 231 (H) 02/15/2024    WHITEASH 0.30 (Equiv IgE) 02/15/2024    SILVERBIRCH 0.18 (Equiv IgE) 02/15/2024    BOXELDER 0.90 (Mod) 02/15/2024    MOUNTJUNIPER <0.10 02/15/2024    COTTONWOOD 0.19 (Equiv IgE) 02/15/2024    ELM 0.17 (Equiv IgE) 02/15/2024    MULBERRY <0.10 02/15/2024    PECANHICKORY 0.22 (Equiv IgE) 02/15/2024    MAPLESYCAMOR 0.13 (Equiv IgE) 02/15/2024    WALNUT 0.38 (Low) 02/15/2024    OAK 0.13 (Equiv IgE) 02/15/2024    BERMUDAGR 0.21 (Equiv IgE) 02/15/2024     JOHNSONGR <0.10 02/15/2024    BLUEGRASS 0.43 (Low) 02/15/2024    TIMOTHYGRASS 0.32 (Equiv IgE) 02/15/2024     Lab Results   Component Value Date    LAMBQUART 0.14 (Equiv IgE) 02/15/2024    PIGWEED 0.21 (Equiv IgE) 02/15/2024    COMRAGWEED 0.23 (Equiv IgE) 02/15/2024    RUSSIANT <0.10 02/15/2024    SHEEPSOR 0.20 (Equiv IgE) 02/15/2024    PLANTAIN 0.28 (Equiv IgE) 02/15/2024    CATEPI 0.54 (Low) 02/15/2024    DOGEPI 0.11 (Equiv IgE) 02/15/2024    ALTERNA <0.10 02/15/2024    CLADHERB <0.10 02/15/2024    ICA04 <0.10 02/15/2024    PENICILLIUM <0.10 02/15/2024    DERMFAR <0.10 02/15/2024    DERMPTE <0.10 02/15/2024    COCKR <0.10 02/15/2024       PFT 1/17/24:  FEV1: 75% predicted  FVC:  96% predicted  FEV1/FVC: 63  Inspiratory loops: no significant flattening of inspiratory loop  Post-bronchodilator assessment: 12% change in FEV1     Impression:  1. Nasal polyp    2. Moderate persistent asthma without complication ([facility])    3. Allergic rhinitis due to animal hair or dander        Assessment and Plan:  Allergic rhinitis, perennial: Environmental allergen specific IgE testing in 2/2024 was positive to cat with many very low positives to pollen.  She does not have pets at home.  Continue Xhance and may use either Claritin or Cetirizine.       Asthma, moderate persistent; nasal polyposis; AERD/Samter's triad: History of aspirin sensitive asthma, s/p aspirin desensitization.  She has been maintained on 325 mg daily and has recurrent polypoid growth.  PFTs 1/17/2024 show FEV1 75% predicted, FVC 96% predicted with 12% change in FEV1 postbronchodilator. She started Dupixent in February 2024. She would like to come off aspirin if possible.  Advised following up with ENT to check on polyp regression prior to making changes with aspirin therapy.  Continue Breo and as needed albuterol.  She will let us know if any further asthma flares.     Plan for follow up in 3 months or sooner if needed

## 2024-09-30 DIAGNOSIS — J33.9 NASAL POLYP: ICD-10-CM

## 2024-10-01 RX ORDER — DUPILUMAB 300 MG/2ML
INJECTION, SOLUTION SUBCUTANEOUS
Qty: 1 EACH | Refills: 11 | Status: SHIPPED | OUTPATIENT
Start: 2024-10-01

## 2025-01-09 ENCOUNTER — TELEPHONE (OUTPATIENT)
Dept: ALLERGY | Facility: CLINIC | Age: 55
End: 2025-01-09
Payer: COMMERCIAL

## 2025-01-10 NOTE — PROGRESS NOTES
Sinus & Skull Base Surgery    Chief Complaint:  1.  Chronic sinusitis with nasal polyposis s/p bilateral sinus surgery 8/17/18; revision through [facility] 2020  2.  Nasal airway obstruction s/p nasal airway surgery 8/17/18   3.  Postnasal drainage  4.  Decreased sense of smell  5.  Headaches  6.  Throat clearing, coughing  7.  Intermittent epistaxis  8.  Asthma / aspirin sensitivity (s/p ASA desensitization)  9.  Allergic rhinitis    History Of Present Illness:  Kate Navarro presents since last being seen 11/27/2023.     She has baseline symptoms as outlined below.  She is currently utilizing Dupixent every 2 weeks.    Main Symptoms:  Patient does not have anterior nasal drainage.  Patient has posterior nasal drainage.  At baseline  Patient does not have nasal airway obstruction.  Patient does not have facial pain.    Patient has facial pressure.  Last week she had some pressure / pain took Sudafed and this improved  Patient has decreased sense of smell. Decreased 80 % of normal.  Associated Symptoms:  Patient does not have headaches.  Patient has throat clearing.  Patient has coughing.  Patient does not have dysphonia.  Patient has nasal bleeding.  With nose blowing.     Medications currently on for sinonasal symptoms:  Dupixent every 2 weeks  Singulair (was asked to stop this today)  Cetirizine   Breo  ASA 325mg  Budesonide Qday    Active Problems:  There is no problem list on file for this patient.    Past Medical History:  She has a past medical history of Allergy status to unspecified drugs, medicaments and biological substances, Chronic maxillary sinusitis (11/13/2018), Encounter for fertility testing (06/26/2015), Encounter for screening for infections with a predominantly sexual mode of transmission (03/15/2018), Hypertrophy of nasal turbinates (05/21/2018), Other acute sinusitis (02/11/2017), Other conditions influencing health status (06/26/2015), Other conditions influencing health status  (02/11/2017), Other ovarian dysfunction (09/04/2015), Personal history of other benign neoplasm (06/26/2015), Personal history of other diseases of the respiratory system, Personal history of other diseases of the respiratory system (08/28/2018), Personal history of other diseases of the respiratory system (07/10/2018), Personal history of other diseases of the respiratory system (07/10/2018), Personal history of other specified conditions (07/10/2018), Unspecified asthma, uncomplicated ([facility]) (07/10/2018), and Unspecified hearing loss, left ear (11/13/2018).    Surgical History:  She has no past surgical history on file.    Family History:  No family history on file.  Social History:  She reports that she has been smoking cigarettes. She has never been exposed to tobacco smoke. She does not have any smokeless tobacco history on file. No history on file for alcohol use and drug use.    Allergies:  Lidocaine, Phenylephrine, and Tetracaine    Current Meds:  Current Outpatient Medications:     albuterol (ProAir HFA) 90 mcg/actuation inhaler, Inhale 2 puffs every 4 hours if needed for wheezing (with spacer)., Disp: 8.5 g, Rfl: 3    albuterol 2.5 mg /3 mL (0.083 %) nebulizer solution, Inhale 3 mL (2.5 mg) every 6 hours if needed., Disp: , Rfl:     albuterol 90 mcg/actuation inhaler, Puffs every 6 hours, Disp: , Rfl:     aspirin 325 mg tablet, Take 1 tablet (325 mg) by mouth once daily., Disp: , Rfl:     Breo Ellipta 200-25 mcg/dose inhaler, INHALE 1 PUFF BY MOUTH EVERY DAY AS DIRECTED, Disp: , Rfl:     budesonide (Pulmicort) 0.5 mg/2 mL nebulizer solution, USE 1 VIAL VIA NEBULIZER TWICE DAILY, Disp: , Rfl:     cetirizine (ZyrTEC) 10 mg tablet, Take 1 tablet (10 mg) by mouth once daily as needed for allergies., Disp: 30 tablet, Rfl: 11    Dupixent Pen 300 mg/2 mL pen injector, INJECT 300 MG (1 INJECTOR) SUBCUTANEOUSLY EVERY 14 DAYS, Disp: 1 each, Rfl: 11    hydroCHLOROthiazide (HYDRODiuril) 25 mg tablet, Take 1 tablet  "(25 mg) by mouth once daily. (Patient not taking: Reported on 1/13/2025), Disp: , Rfl:     inhalational spacing device (Aerochamber MV) inhaler, Use as instructed, Disp: 1 each, Rfl: 0    loratadine (Claritin Liqui-Gel) 10 mg capsule, Take by mouth., Disp: , Rfl:     montelukast (Singulair) 10 mg tablet, Take 1 tablet (10 mg) by mouth once daily at bedtime. (Patient not taking: Reported on 1/13/2025), Disp: , Rfl:     omeprazole (PriLOSEC) 40 mg DR capsule, Take 1 capsule (40 mg) by mouth once daily., Disp: , Rfl:     sodium chloride (Deep Sea Nasal) 0.65 % nasal spray, Administer into affected nostril(s)., Disp: , Rfl:     Xhance 93 mcg/actuation aerosol breath activated, Administer 1 spray into affected nostril(s) 2 times a day., Disp: 16 mL, Rfl: 11    Vitals:  Visit Vitals  Temp 36.1 °C (96.9 °F)   Ht 1.6 m (5' 3\")   Wt 71.1 kg (156 lb 12.8 oz)   BMI 27.78 kg/m²   Smoking Status Every Day   BSA 1.78 m²     Physical Exam:  Nose: On external exam there are neither lesions nor asymmetry of the nasal tip/dorsum. On anterior rhinoscopy, visualization posteriorly is limited on anterior examination. For this reason, to adequately evaluate posteriorly for masses, source of epistaxis, polypoid disease, debridement, and/or signs of infections, nasal endoscopy is indicated.  (Please see procedure below.)    SINONASAL ENDOSCOPY (CPT 88694): To better evaluate the patient's symptoms, sinonasal endoscopy is indicated.  After discussion of risks and benefits, and topical decongestion and anesthesia, an endoscope was used to perform nasal endoscopy on each side.  A time out identifying the patient, the procedure, the location of the procedure and any concerns was performed prior to beginning the procedure.    Findings:  Examination of the right nasal cavity revealed a patent and normal-appearing maxillary, ethmoid, and sphenoid without significant mucosal inflammation.  Examination of the left nasal cavity revealed a patent " left maxillary, ethmoid, sphenoid, and frontal.  There were some polypoid changes within the frontal drainage pathway.    Provider Impressions:  1.  Chronic sinusitis with nasal polyposis s/p bilateral sinus surgery 8/17/18; revision through [facility] 2020  2.  Nasal airway obstruction s/p nasal airway surgery 8/17/18   3.  Postnasal drainage  4.  Decreased sense of smell  5.  Facial pressure  6.  Throat clearing, coughing  7.  Intermittent epistaxis  8.  Asthma / aspirin sensitivity (s/p ASA desensitization)  9.  Allergic rhinitis    Discussion:  Kate Navarro appeared very well on examination today.  She was evaluated by Dr. Cardoza January 13, 2025 and at that evaluation they discussed going from aspirin 325 mg to 81 mg of aspirin if her exam today did not demonstrate significant polypoid tissue.  I think this is completely reasonable given the appearance of her exam.  She and Dr. Cardoza also discussed discontinuation of Singulair.    She questioned whether or not she could discontinue additional medical therapy and we had a thoughtful discussion about this.  If she plans to do so, I recommended a staged approach so that if she discontinues something and has difficulty it will be very clear as to what she needs to resume.  I suggested beginning with the change with aspirin and then I suggested discontinuing Singulair about 1 month later.  If after 1 month she has not noticed a change in her symptoms I would discontinue cetirizine.  She was comfortable continuing budesonide rinses in the short-term but we could also certainly consider discontinuation of that if she remains minimally symptomatic moving forward.  A refill was provided.    If she does well, I think repeat assessment in 12 months would make sense.  If she is having recurrence of symptoms I would like to see her before that or if she would like to consider additional changes to her medical therapy.  All questions were answered.    Scribe Attestation  By  signing my name below, I, Ivan Hall, attest that this documentation has been prepared under the direction and in the presence of Francois Ceballos MD.    Signature:  Francois Ceballos MD

## 2025-01-13 ENCOUNTER — APPOINTMENT (OUTPATIENT)
Dept: OTOLARYNGOLOGY | Facility: CLINIC | Age: 55
End: 2025-01-13
Payer: COMMERCIAL

## 2025-01-13 ENCOUNTER — APPOINTMENT (OUTPATIENT)
Dept: ALLERGY | Facility: CLINIC | Age: 55
End: 2025-01-13
Payer: COMMERCIAL

## 2025-01-13 VITALS
OXYGEN SATURATION: 97 % | BODY MASS INDEX: 26.58 KG/M2 | TEMPERATURE: 97.6 F | HEART RATE: 57 BPM | SYSTOLIC BLOOD PRESSURE: 156 MMHG | HEIGHT: 63 IN | DIASTOLIC BLOOD PRESSURE: 93 MMHG | WEIGHT: 150 LBS

## 2025-01-13 VITALS — WEIGHT: 156.8 LBS | BODY MASS INDEX: 27.78 KG/M2 | HEIGHT: 63 IN | TEMPERATURE: 96.9 F

## 2025-01-13 DIAGNOSIS — J33.9 NASAL POLYP: Primary | ICD-10-CM

## 2025-01-13 DIAGNOSIS — R43.8 DECREASED SENSE OF SMELL: Primary | ICD-10-CM

## 2025-01-13 DIAGNOSIS — J45.909 SAMTER'S TRIAD (HHS-HCC): ICD-10-CM

## 2025-01-13 DIAGNOSIS — Z88.6 SAMTER'S TRIAD (HHS-HCC): ICD-10-CM

## 2025-01-13 DIAGNOSIS — J45.909 ASTHMA, UNSPECIFIED ASTHMA SEVERITY, UNSPECIFIED WHETHER COMPLICATED, UNSPECIFIED WHETHER PERSISTENT (HHS-HCC): ICD-10-CM

## 2025-01-13 DIAGNOSIS — J32.2 CHRONIC ETHMOIDAL SINUSITIS: ICD-10-CM

## 2025-01-13 DIAGNOSIS — J30.81 ALLERGIC RHINITIS DUE TO ANIMAL HAIR OR DANDER: ICD-10-CM

## 2025-01-13 DIAGNOSIS — J33.9 SAMTER'S TRIAD (HHS-HCC): ICD-10-CM

## 2025-01-13 DIAGNOSIS — J45.40 MODERATE PERSISTENT ASTHMA WITHOUT COMPLICATION (HHS-HCC): ICD-10-CM

## 2025-01-13 DIAGNOSIS — R44.8 FACIAL PRESSURE: ICD-10-CM

## 2025-01-13 PROCEDURE — 99214 OFFICE O/P EST MOD 30 MIN: CPT | Performed by: ALLERGY & IMMUNOLOGY

## 2025-01-13 PROCEDURE — 94010 BREATHING CAPACITY TEST: CPT | Performed by: ALLERGY & IMMUNOLOGY

## 2025-01-13 PROCEDURE — 99213 OFFICE O/P EST LOW 20 MIN: CPT | Performed by: OTOLARYNGOLOGY

## 2025-01-13 PROCEDURE — 3008F BODY MASS INDEX DOCD: CPT | Performed by: ALLERGY & IMMUNOLOGY

## 2025-01-13 PROCEDURE — 3008F BODY MASS INDEX DOCD: CPT | Performed by: OTOLARYNGOLOGY

## 2025-01-13 PROCEDURE — 31231 NASAL ENDOSCOPY DX: CPT | Performed by: OTOLARYNGOLOGY

## 2025-01-13 RX ORDER — INHALER, ASSIST DEVICES
SPACER (EA) MISCELLANEOUS
Qty: 1 EACH | Refills: 0 | Status: SHIPPED | OUTPATIENT
Start: 2025-01-13

## 2025-01-13 RX ORDER — ALBUTEROL SULFATE 90 UG/1
2 INHALANT RESPIRATORY (INHALATION) EVERY 4 HOURS PRN
Qty: 8.5 G | Refills: 3 | Status: SHIPPED | OUTPATIENT
Start: 2025-01-13 | End: 2025-01-14 | Stop reason: SDUPTHER

## 2025-01-13 ASSESSMENT — ENCOUNTER SYMPTOMS
CONSTITUTIONAL NEGATIVE: 1
HEMATOLOGIC/LYMPHATIC NEGATIVE: 1
MUSCULOSKELETAL NEGATIVE: 1
RESPIRATORY NEGATIVE: 1
GASTROINTESTINAL NEGATIVE: 1
ALLERGIC/IMMUNOLOGIC NEGATIVE: 1
CARDIOVASCULAR NEGATIVE: 1
EYES NEGATIVE: 1

## 2025-01-13 ASSESSMENT — PATIENT HEALTH QUESTIONNAIRE - PHQ9
SUM OF ALL RESPONSES TO PHQ9 QUESTIONS 1 AND 2: 0
1. LITTLE INTEREST OR PLEASURE IN DOING THINGS: NOT AT ALL
2. FEELING DOWN, DEPRESSED OR HOPELESS: NOT AT ALL

## 2025-01-13 NOTE — PATIENT INSTRUCTIONS
It was nice to see you today     Continue Dupixent every 2 weeks    Continue Cetirizine, Breo, Albuterol as needed,  Xhance     Discontinue montelukast    If nasal polyps are well controlled, you can go down to 81 mg aspirin daily     We would like to see you in follow up in 6 months or sooner if needed

## 2025-01-13 NOTE — PROGRESS NOTES
Kate Navarro presents for follow-up visit today.  She has been doing well since last visit.  She states that her asthma has been well-controlled on Breo.  She has not used her albuterol rescue since last visit. She notes that she has mild nasal congestion and postnasal drainage.  She had a sinus infection treated with antibiotics 1 month ago. She is on Dupixent 300 mg every 2 weeks.  She continues on Xhance and budesonide nasal saline rinses.  She takes cetirizine 10 mg, montelukast 10mg  and aspirin 325 mg daily.       Environmental History:  Type of home:  House  Pets in the house: None  Mold or moisture in the home: None  Bedroom osmar: Carpet  Dust mite covers on bed:  Yes  Cigarette exposure in the home:  Yes 2-3 cigs daily  Occupation/School: works from home for an [employer], works from home, then works at [employer] in evenings     Review of Systems   Constitutional: Negative.    HENT: Negative.     Eyes: Negative.    Respiratory: Negative.     Cardiovascular: Negative.    Gastrointestinal: Negative.    Musculoskeletal: Negative.    Skin: Negative.    Allergic/Immunologic: Negative.    Hematological: Negative.       Vital signs:  Not obtained due to virtual visit    Physical Exam:  Alert, oriented and in no acute distress. No vocal wheeze or cough noted     Environmental IgE testing    Lab Results   Component Value Date    ICIGE 231 (H) 02/15/2024    WHITEASH 0.30 (Equiv IgE) 02/15/2024    SILVERBIRCH 0.18 (Equiv IgE) 02/15/2024    BOXELDER 0.90 (Mod) 02/15/2024    MOUNTJUNIPER <0.10 02/15/2024    COTTONWOOD 0.19 (Equiv IgE) 02/15/2024    ELM 0.17 (Equiv IgE) 02/15/2024    MULBERRY <0.10 02/15/2024    PECANHICKORY 0.22 (Equiv IgE) 02/15/2024    MAPLESYCAMOR 0.13 (Equiv IgE) 02/15/2024    OAK 0.13 (Equiv IgE) 02/15/2024    BERMUDAGR 0.21 (Equiv IgE) 02/15/2024    JOHNSONGR <0.10 02/15/2024    BLUEGRASS 0.43 (Low) 02/15/2024    TIMOTHYGRASS 0.32 (Equiv IgE) 02/15/2024     Lab Results   Component Value Date     LAMBQUART 0.14 (Equiv IgE) 02/15/2024    PIGWEED 0.21 (Equiv IgE) 02/15/2024    COMRAGWEED 0.23 (Equiv IgE) 02/15/2024    RUSSIANT <0.10 02/15/2024    SHEEPSOR 0.20 (Equiv IgE) 02/15/2024    PLANTAIN 0.28 (Equiv IgE) 02/15/2024    CATEPI 0.54 (Low) 02/15/2024    DOGEPI 0.11 (Equiv IgE) 02/15/2024    ALTERNA <0.10 02/15/2024    CLADHERB <0.10 02/15/2024    ICA04 <0.10 02/15/2024    PENICILLIUM <0.10 02/15/2024    DERMFAR <0.10 02/15/2024    DERMPTE <0.10 02/15/2024    COCKR <0.10 02/15/2024     Office spirometry 01/13/25:  FEV1: 93% predicted  FVC:  103% predicted  FEV1/FVC: 0.726  Inspiratory loops: no significant flattening  Post-bronchodilator assessment: No     Impression:  1. Nasal polyp    2. Moderate persistent asthma without complication ([facility])    3. Allergic rhinitis due to animal hair or dander          Assessment and Plan:  Asthma, moderate persistent; nasal polyposis; AERD/Samter's triad: History of aspirin sensitive asthma, s/p aspirin desensitization. She has been doing very well since last visit.  She has been maintained on aspirin 325 mg daily and was started on Dupixent in Feb 2024 due to recurrent polypoid growth.  PFTs have significantly improved since starting Dupixent with FEV1 93% predicted ( Prior PFT's 1/17/2024 show FEV1 75% predicted, FVC 96% predicted with 12% change in FEV1 postbronchodilator).  Recommend to continue Breo and as needed albuterol.  She will be seeing ENT today. If nasal polyp burden is well controlled, we discussed that she can decrease aspirin to 81 mg in order to to continue to maintain tolerance.  We discussed FDA blackbox warning on montelukast and recommend discontinuation.    Allergic rhinitis, perennial: Environmental allergen specific IgE testing in 2/2024 was positive to cat with many very low positives to pollen.  She does not have pets at home.  Continue Xhance and Cetirizine.       Plan for follow up in 6 months or sooner if needed

## 2025-01-14 ENCOUNTER — APPOINTMENT (OUTPATIENT)
Dept: OTOLARYNGOLOGY | Facility: CLINIC | Age: 55
End: 2025-01-14
Payer: COMMERCIAL

## 2025-01-14 ENCOUNTER — TELEPHONE (OUTPATIENT)
Dept: ALLERGY | Facility: HOSPITAL | Age: 55
End: 2025-01-14

## 2025-01-14 DIAGNOSIS — J45.40 MODERATE PERSISTENT ASTHMA WITHOUT COMPLICATION (HHS-HCC): ICD-10-CM

## 2025-01-14 DIAGNOSIS — J45.909 ASTHMA, UNSPECIFIED ASTHMA SEVERITY, UNSPECIFIED WHETHER COMPLICATED, UNSPECIFIED WHETHER PERSISTENT (HHS-HCC): ICD-10-CM

## 2025-01-14 RX ORDER — BUDESONIDE 0.5 MG/2ML
0.5 INHALANT ORAL 2 TIMES DAILY
Qty: 90 ML | Refills: 3 | Status: SHIPPED | OUTPATIENT
Start: 2025-01-14 | End: 2025-01-15

## 2025-01-14 RX ORDER — ALBUTEROL SULFATE 90 UG/1
2 INHALANT RESPIRATORY (INHALATION) EVERY 4 HOURS PRN
Qty: 8.5 G | Refills: 3 | Status: SHIPPED | OUTPATIENT
Start: 2025-01-14 | End: 2026-01-14

## 2025-01-14 NOTE — TELEPHONE ENCOUNTER
Called [facility] regarding albuterol [facility] not able to fill albuterol, re-sending to [facility]. Spoke with patient, she is aware and states she does not need a spacer as she already has one.    L/m for  patient to remind him to please schedule a CT  soon.  Number for Central scheduling provided  [phone].

## 2025-01-15 RX ORDER — BUDESONIDE 0.5 MG/2ML
INHALANT ORAL
Qty: 360 ML | Refills: 3 | Status: SHIPPED | OUTPATIENT
Start: 2025-01-15

## 2025-04-01 DIAGNOSIS — J30.81 ALLERGIC RHINITIS DUE TO ANIMAL HAIR OR DANDER: ICD-10-CM

## 2025-04-01 RX ORDER — CETIRIZINE HYDROCHLORIDE 10 MG/1
10 TABLET ORAL DAILY PRN
Qty: 30 TABLET | Refills: 11 | Status: SHIPPED | OUTPATIENT
Start: 2025-04-01 | End: 2025-04-01 | Stop reason: SDUPTHER

## 2025-04-02 RX ORDER — CETIRIZINE HYDROCHLORIDE 10 MG/1
10 TABLET ORAL DAILY PRN
Qty: 30 TABLET | Refills: 11 | Status: SHIPPED | OUTPATIENT
Start: 2025-04-02 | End: 2026-04-02

## 2025-08-25 DIAGNOSIS — J33.9 NASAL POLYP: ICD-10-CM

## 2025-08-25 RX ORDER — DUPILUMAB 300 MG/2ML
INJECTION, SOLUTION SUBCUTANEOUS
Qty: 4 ML | Refills: 11 | Status: SHIPPED | OUTPATIENT
Start: 2025-08-25

## 2025-09-30 ENCOUNTER — APPOINTMENT (OUTPATIENT)
Dept: OTOLARYNGOLOGY | Facility: CLINIC | Age: 55
End: 2025-09-30
Payer: COMMERCIAL

## 2025-10-23 ENCOUNTER — APPOINTMENT (OUTPATIENT)
Dept: ALLERGY | Facility: CLINIC | Age: 55
End: 2025-10-23
Payer: COMMERCIAL